# Patient Record
Sex: MALE
[De-identification: names, ages, dates, MRNs, and addresses within clinical notes are randomized per-mention and may not be internally consistent; named-entity substitution may affect disease eponyms.]

---

## 2018-02-04 ENCOUNTER — HOSPITAL ENCOUNTER (EMERGENCY)
Dept: HOSPITAL 31 - C.ER | Age: 53
LOS: 1 days | Discharge: HOME | End: 2018-02-05
Payer: COMMERCIAL

## 2018-02-04 VITALS — RESPIRATION RATE: 18 BRPM

## 2018-02-04 DIAGNOSIS — N13.2: Primary | ICD-10-CM

## 2018-02-04 LAB
ALBUMIN SERPL-MCNC: 4.4 G/DL (ref 3.5–5)
ALBUMIN/GLOB SERPL: 1.3 {RATIO} (ref 1–2.1)
ALT SERPL-CCNC: 40 U/L (ref 21–72)
AST SERPL-CCNC: 28 U/L (ref 17–59)
BACTERIA #/AREA URNS HPF: (no result) /[HPF]
BASOPHILS # BLD AUTO: 0.1 K/UL (ref 0–0.2)
BASOPHILS NFR BLD: 0.5 % (ref 0–2)
BILIRUB UR-MCNC: NEGATIVE MG/DL
BUN SERPL-MCNC: 11 MG/DL (ref 9–20)
CALCIUM SERPL-MCNC: 9.4 MG/DL (ref 8.6–10.4)
CAOX CRY #/AREA URNS HPF: (no result) /HPF
EOSINOPHIL # BLD AUTO: 0.1 K/UL (ref 0–0.7)
EOSINOPHIL NFR BLD: 0.8 % (ref 0–4)
EOSINOPHIL NFR BLD: 1 % (ref 0–4)
ERYTHROCYTE [DISTWIDTH] IN BLOOD BY AUTOMATED COUNT: 13.2 % (ref 11.5–14.5)
GFR NON-AFRICAN AMERICAN: > 60
GLUCOSE UR STRIP-MCNC: NORMAL MG/DL
HGB BLD-MCNC: 14.1 G/DL (ref 12–18)
HYPOCHROMIC: SLIGHT
LEUKOCYTE ESTERASE UR-ACNC: (no result) LEU/UL
LIPASE: 100 U/L (ref 23–300)
LYMPHOCYTE: 13 % (ref 20–40)
LYMPHOCYTES # BLD AUTO: 1.5 K/UL (ref 1–4.3)
LYMPHOCYTES NFR BLD AUTO: 9.6 % (ref 20–40)
MCH RBC QN AUTO: 31.4 PG (ref 27–31)
MCHC RBC AUTO-ENTMCNC: 34 G/DL (ref 33–37)
MCV RBC AUTO: 92.4 FL (ref 80–94)
MONOCYTE: 2 % (ref 0–10)
MONOCYTES # BLD: 0.8 K/UL (ref 0–0.8)
MONOCYTES NFR BLD: 5.3 % (ref 0–10)
NEUTROPHILS # BLD: 13 K/UL (ref 1.8–7)
NEUTROPHILS NFR BLD AUTO: 83 % (ref 50–75)
NEUTROPHILS NFR BLD AUTO: 83.8 % (ref 50–75)
NEUTS BAND NFR BLD: 1 % (ref 0–2)
NRBC BLD AUTO-RTO: 0 % (ref 0–2)
PH UR STRIP: 6 [PH] (ref 5–8)
PLATELET # BLD EST: NORMAL 10*3/UL
PLATELET # BLD: 258 K/UL (ref 130–400)
PMV BLD AUTO: 8.5 FL (ref 7.2–11.7)
PROT UR STRIP-MCNC: (no result) MG/DL
RBC # BLD AUTO: 4.48 MIL/UL (ref 4.4–5.9)
RBC # UR STRIP: (no result) /UL
SP GR UR STRIP: 1.03 (ref 1–1.03)
SQUAMOUS EPITHIAL: 1 /HPF (ref 0–5)
TOTAL CELLS COUNTED BLD: 100
URINE NITRATE: NEGATIVE
UROBILINOGEN UR-MCNC: 2 MG/DL (ref 0.2–1)
WBC # BLD AUTO: 15.5 K/UL (ref 4.8–10.8)

## 2018-02-04 PROCEDURE — 81001 URINALYSIS AUTO W/SCOPE: CPT

## 2018-02-04 PROCEDURE — 80053 COMPREHEN METABOLIC PANEL: CPT

## 2018-02-04 PROCEDURE — 96374 THER/PROPH/DIAG INJ IV PUSH: CPT

## 2018-02-04 PROCEDURE — 83690 ASSAY OF LIPASE: CPT

## 2018-02-04 PROCEDURE — 99284 EMERGENCY DEPT VISIT MOD MDM: CPT

## 2018-02-04 PROCEDURE — 85025 COMPLETE CBC W/AUTO DIFF WBC: CPT

## 2018-02-04 PROCEDURE — 96361 HYDRATE IV INFUSION ADD-ON: CPT

## 2018-02-04 PROCEDURE — 74176 CT ABD & PELVIS W/O CONTRAST: CPT

## 2018-02-04 NOTE — CT
EXAM:

  CT Abdomen and Pelvis Without Intravenous Contrast



CLINICAL HISTORY:

  52 years old, male; Pain; Abdominal pain; Localized; Right; Additional info: 

Suspect r renal colic vs ap



TECHNIQUE:

  Axial computed tomography images of the abdomen and pelvis without 

intravenous contrast.  All CT scans at this facility use one or more dose 

reduction techniques, viz.: automated exposure control; ma/kV adjustment per 

patient size (including targeted exams where dose is matched to indication; 

i.e. head); or iterative reconstruction technique.

  Coronal and sagittal reformatted images were created and reviewed.



COMPARISON:

  No relevant prior studies available.



FINDINGS:

  Limitations:  Motion artifact - mild.  Lack of intravenous contrast.

  Lower thorax: Probable small hiatal hernia.  Elevated LEFT hemidiaphragm.



 ABDOMEN:

  Liver:  Unremarkable.

  Gallbladder and bile ducts:  No calcified stones.  No ductal dilation.

  Pancreas:  Unremarkable.  No ductal dilation.

  Spleen:  No splenomegaly.

  Adrenals:  No mass.

  Kidneys and ureters:  Mild stranding about RIGHT kidney.  Small calculus 

within RIGHT kidney.  Mild-to-moderate pelvocaliectasis of RIGHT kidney.  Mild 

to moderately dilated RIGHT ureter.  0.4 x 0.4 x 0.3 cm calculus at/near RIGHT 

ureterovesical junction.

  Stomach and bowel:  Segmental areas of probable underdistention of colon.  No 

definite mural thickening.  No obstruction.

  Appendix:  Normal caliber.  No inflammation.



 PELVIS:

  Bladder:  Apparent mild bladder wall thickening.  Incomplete distention, 

limiting evaluation.  No stones.

  Reproductive:  Unremarkable as visualized.



 ABDOMEN and PELVIS:

  Intraperitoneal space:  No significant fluid collection.  No free air.

  Bones/joints:  No acute fracture.

  Soft tissues:  Unremarkable.

  Vasculature:  Minimal atherosclerotic disease of aorta.  No aneurysm.

  Lymph nodes:  No pathologically enlarged lymph nodes.



IMPRESSION:     

1.  RIGHT distal ureteral calculus with mild-to-moderate hydroureteronephrosis.

2.  Mild bladder wall thickening versus underdistention.  Clinical correlation 

is needed.

3.  Incidental/non-acute findings are described above.

## 2018-02-04 NOTE — C.PDOC
History Of Present Illness


53yo male, presents to ED for evaluation of right sided abdominal pain 

radiating to his right groin. Patient states pain is colicky in nature and is 

severe at times. He denies any history of renal colic. No other medical 

complaints. 


Time Seen by Provider: 02/04/18 21:24


Chief Complaint (Nursing): Abdominal Pain


History Per: Patient


History/Exam Limitations: no limitations


Onset/Duration Of Symptoms: Persistent


Location Of Pain/Discomfort: RUQ, RLQ


Radiation Of Pain To:: Other (right groin)


Quality Of Discomfort: "Pain"





Past Medical History


Reviewed: Historical Data, Nursing Documentation, Vital Signs


Vital Signs: 


 Last Vital Signs











Temp  98.4 F   02/04/18 20:47


 


Pulse  69   02/04/18 20:47


 


Resp  18   02/04/18 20:47


 


BP  143/92 H  02/04/18 20:47


 


Pulse Ox  96   02/04/18 23:57














- Medical History


PMH: No Chronic Diseases


Surgical History: No Surg Hx





- [facility] Procedures








REPOSITION RIGHT TIBIA WITH INT FIX, OPEN APPROACH (11/10/16)








Family History: States: Unknown Family Hx





- Social History


Hx Tobacco Use: No


Hx Alcohol Use: Yes


Hx Substance Use: No





- Immunization History


Hx Tetanus Toxoid Vaccination: No


Hx Influenza Vaccination: No


Hx Pneumococcal Vaccination: No





Review Of Systems


Except As Marked, All Systems Reviewed And Found Negative.


Constitutional: Negative for: Fever, Chills


Gastrointestinal: Positive for: Abdominal Pain


Genitourinary: Positive for: Other (right sided groin pain)





Physical Exam





- Physical Exam


Appears: Non-toxic, Other (writhing in bed)


Skin: Warm, Dry


Eye(s): bilateral: Normal Inspection


Neck: Supple


Chest: Symmetrical


Cardiovascular: Rhythm Regular


Respiratory: Normal Breath Sounds


Gastrointestinal/Abdominal: Bowel Sounds, Soft, No Tenderness


Back: No CVA Tenderness, No Vertebral Tenderness


Male Genital: Normal Inspection, No Other (groin tenderness)


Neurological/Psych: Oriented x3





ED Course And Treatment





- Laboratory Results


Result Diagrams: 


 02/04/18 21:33





 02/04/18 22:04


Lab Interpretation: Abnormal ( RBC's)


O2 Sat by Pulse Oximetry: 96 (RA)


Pulse Ox Interpretation: Normal





- CT Scan/US


  ** CT ABd/Pelvis


Other Rad Studies (CT/US): Interpreted By Me (R distal UVJ stone 4mm), 

Radiology Report Reviewed


Progress Note: IVF, toradol IV


Reevaluation Time: 23:43


Reassessment Condition: Improved





Medical Decision Making


Medical Decision Making: 





Impression: first episode of renal colic


Plan:


-- Toradol 30mg IVP


-- IV Fluids


-- Labs


-- CT Abdomen w/o contrast





FINDINGS:


Limitations: Motion artifact - mild. Lack of intravenous contrast.


Lower thorax: Probable small hiatal hernia. Elevated LEFT hemidiaphragm.


ABDOMEN:


Liver: Unremarkable.


Gallbladder and bile ducts: No calcified stones. No ductal dilation.


Pancreas: Unremarkable. No ductal dilation.


Spleen: No splenomegaly.


Adrenals: No mass.


Kidneys and ureters: Mild stranding about RIGHT kidney. Small calculus within 

RIGHT kidney.


Mild-to-moderate pelvocaliectasis of RIGHT kidney. Mild to moderately dilated 

RIGHT ureter. 0.4 x


0.4 x 0.3 cm calculus at/near RIGHT ureterovesical junction.


Stomach and bowel: Segmental areas of probable underdistention of colon. No 

definite mural


thickening. No obstruction.


Appendix: Normal caliber. No inflammation.


PELVIS:


Bladder: Apparent mild bladder wall thickening. Incomplete distention, limiting 

evaluation. No


stones.


Reproductive: Unremarkable as visualized.


ABDOMEN and PELVIS:


Intraperitoneal space: No significant fluid collection. No free air.


Bones/joints: No acute fracture.


Soft tissues: Unremarkable.


Vasculature: Minimal atherosclerotic disease of aorta. No aneurysm.


Lymph nodes: No pathologically enlarged lymph nodes.


IMPRESSION:


1. RIGHT distal ureteral calculus with mild-to-moderate hydroureteronephrosis.


2. Mild bladder wall thickening versus underdistention. Clinical correlation is 

needed.


3. Incidental/non-acute findings are described above.











Disposition


Doctor Will See Patient In The: Office


Counseled Patient/Family Regarding: Diagnosis





- Disposition


Referrals: 


[facility] at [facility] [Outside]


Oscar Haas MD [Staff Provider] - 


Non [facility] Provider, [Primary Care Provider] - 


Disposition: HOME/ ROUTINE


Disposition Time: 23:43


Condition: GOOD


Additional Instructions: 


jose mucha agua


Ibuprofeno 600 mg cada 6 horas urmila necessario


todas las orinas- pasa por un "strainer" buscando la jeana





Sigue con [facility] con Dr. Sewell- [facility] 

edu[facility].








Prescriptions: 


traMADol [Ultram] 50 mg PO Q6H PRN #10 tab


 PRN Reason: pain 


Instructions:  Renal Colic (ED)


Forms:  [facility] (English)


Print Language: Senegalese





- Clinical Impression


Clinical Impression: 


 Renal colic on right side








- Scribe Statement


The provider has reviewed the documentation as recorded by the Scribe (Medina Barillas)


Provider Attestation: 


All medical record entries made by the Scribe were at my direction and 

personally dictated by me. I have reviewed the chart and agree that the record 

accurately reflects my personal performance of the history, physical exam, 

medical decision making, and the department course for this patient. I have 

also personally directed, reviewed, and agree with the discharge instructions 

and disposition.

## 2018-02-05 VITALS
HEART RATE: 83 BPM | TEMPERATURE: 98.2 F | OXYGEN SATURATION: 98 % | DIASTOLIC BLOOD PRESSURE: 86 MMHG | SYSTOLIC BLOOD PRESSURE: 123 MMHG

## 2018-03-25 ENCOUNTER — HOSPITAL ENCOUNTER (EMERGENCY)
Dept: HOSPITAL 31 - C.ER | Age: 53
Discharge: HOME | End: 2018-03-25
Payer: COMMERCIAL

## 2018-03-25 VITALS
TEMPERATURE: 98.5 F | RESPIRATION RATE: 14 BRPM | DIASTOLIC BLOOD PRESSURE: 70 MMHG | SYSTOLIC BLOOD PRESSURE: 120 MMHG | HEART RATE: 70 BPM

## 2018-03-25 VITALS — OXYGEN SATURATION: 96 %

## 2018-03-25 DIAGNOSIS — L02.212: Primary | ICD-10-CM

## 2018-03-25 NOTE — C.PDOC
History Of Present Illness


52 year old male presents to the ED with a complaint of a painful lump to the 

back of the neck beginning two days ago. Patient reports he has a history of 

similar symptoms last year but was able to pop/drain the abscess on his own. 

Patient states he tried to drain this one but it became more swollen. Denies 

fever, drainage, numbness, or weakness.


Time Seen by Provider: 03/25/18 20:26


Chief Complaint (Nursing): Abnormal Skin Integrity


History Per: Patient


History/Exam Limitations: no limitations


Onset/Duration Of Symptoms: Days (x 2 )


Current Symptoms Are (Timing): Still Present


Location Of Injury: Posterior: Neck


Quality Of Symptoms: Painful





Past Medical History


Reviewed: Historical Data, Nursing Documentation, Vital Signs


Vital Signs: 


 Last Vital Signs











Temp  98.5 F   03/25/18 21:33


 


Pulse  70   03/25/18 21:33


 


Resp  14   03/25/18 21:33


 


BP  120/70   03/25/18 21:33


 


Pulse Ox  99   03/25/18 21:33














- Medical History


PMH: Kidney Stones, Chronic Kidney Disease





- [facility] Procedures








REPOSITION RIGHT TIBIA WITH INT FIX, OPEN APPROACH (11/10/16)








Family History: States: Unknown Family Hx





- Social History


Hx Tobacco Use: No


Hx Alcohol Use: Yes


Hx Substance Use: No





- Immunization History


Hx Tetanus Toxoid Vaccination: No


Hx Influenza Vaccination: No


Hx Pneumococcal Vaccination: No





Review Of Systems


Except As Marked, All Systems Reviewed And Found Negative.


Constitutional: Negative for: Fever


Musculoskeletal: Positive for: Neck Pain (abscess located on the back )


Neurological: Negative for: Weakness, Numbness





Physical Exam





- Physical Exam


Appears: Non-toxic, No Acute Distress


Skin: Normal Color, Warm, Dry


Head: Atraumatic, Normacephalic


Eye(s): bilateral: Normal Inspection


Oral Mucosa: Moist


Throat: Normal


Neck: Normal ROM, Other (2 x 3 cm area erythema, swelling, tenderness with 

positive induration to left posterior neck)


Lymphatic: No Adenopathy


Respiratory: Normal Breath Sounds, No Rales, No Rhonchi, No Wheezing


Extremity: Normal ROM


Neurological/Psych: Oriented x3, Normal Speech, Normal Motor, Normal Sensation


Gait: Steady





ED Course And Treatment


O2 Sat by Pulse Oximetry: 96 (RA)


Pulse Ox Interpretation: Normal





Medical Decision Making


Medical Decision Making: 


Bactrim, keflex, ibuprofen ordered. Patient discharged on antibiotics, 

instructed to apply warm compress 4-5 times a day to the area.





Disposition





- Disposition


Referrals: 


[facility] at [facility] [Outside]


Disposition: HOME/ ROUTINE


Disposition Time: 21:10


Condition: GOOD


Additional Instructions: 


Apply warm compresses to the region 4-5 times per day. Return in 2 days for 

possible I&D. 


Prescriptions: 


Cephalexin [Keflex] 500 mg PO BID #19 capsule


Ibuprofen [Motrin] 600 mg PO TID #21 tab


Sulfamethoxazole/Trimethoprim [Bactrim  mg-160 mg] 1 tab PO BID #14 tab


Instructions:  Skin Abscess


Forms:  [employer] (English)


Print Language: Yakut





- Clinical Impression


Clinical Impression: 


 Skin abscess








- Scribe Statement


The provider has reviewed the documentation as recorded by the Normaibcarina Phillips 





All medical record entries made by the Normaibcarina were at my direction and 

personally dictated by me. I have reviewed the chart and agree that the record 

accurately reflects my personal performance of the history, physical exam, 

medical decision making, and the department course for this patient. I have 

also personally directed, reviewed, and agree with the discharge instructions 

and disposition.